# Patient Record
Sex: MALE | Race: WHITE | Employment: FULL TIME | URBAN - METROPOLITAN AREA
[De-identification: names, ages, dates, MRNs, and addresses within clinical notes are randomized per-mention and may not be internally consistent; named-entity substitution may affect disease eponyms.]

---

## 2017-05-25 ENCOUNTER — HOSPITAL ENCOUNTER (OUTPATIENT)
Dept: LAB | Age: 27
Discharge: HOME | End: 2017-05-25
Payer: COMMERCIAL

## 2017-05-25 ENCOUNTER — OFFICE VISIT (OUTPATIENT)
Dept: FAMILY MEDICINE CLINIC | Age: 27
End: 2017-05-25

## 2017-05-25 VITALS
HEART RATE: 115 BPM | WEIGHT: 288.4 LBS | BODY MASS INDEX: 42.72 KG/M2 | HEIGHT: 69 IN | OXYGEN SATURATION: 97 % | TEMPERATURE: 98.6 F | DIASTOLIC BLOOD PRESSURE: 90 MMHG | SYSTOLIC BLOOD PRESSURE: 120 MMHG | RESPIRATION RATE: 18 BRPM

## 2017-05-25 DIAGNOSIS — R10.11 RUQ PAIN: ICD-10-CM

## 2017-05-25 DIAGNOSIS — E03.9 ACQUIRED HYPOTHYROIDISM: ICD-10-CM

## 2017-05-25 DIAGNOSIS — K21.9 GASTROESOPHAGEAL REFLUX DISEASE, ESOPHAGITIS PRESENCE NOT SPECIFIED: ICD-10-CM

## 2017-05-25 DIAGNOSIS — E66.01 MORBID OBESITY DUE TO EXCESS CALORIES (HCC): ICD-10-CM

## 2017-05-25 DIAGNOSIS — R19.7 DIARRHEA, UNSPECIFIED TYPE: ICD-10-CM

## 2017-05-25 DIAGNOSIS — E03.9 ACQUIRED HYPOTHYROIDISM: Primary | ICD-10-CM

## 2017-05-25 LAB
ALBUMIN SERPL BCP-MCNC: 4.4 G/DL (ref 3.4–5)
ALBUMIN/GLOB SERPL: 1.3 {RATIO} (ref 0.8–1.7)
ALP SERPL-CCNC: 79 U/L (ref 45–117)
ALT SERPL-CCNC: 106 U/L (ref 16–61)
AMYLASE SERPL-CCNC: 31 U/L (ref 25–115)
ANION GAP BLD CALC-SCNC: 10 MMOL/L (ref 3–18)
AST SERPL W P-5'-P-CCNC: 30 U/L (ref 15–37)
BASOPHILS # BLD AUTO: 0 K/UL (ref 0–0.06)
BASOPHILS # BLD: 0 % (ref 0–2)
BILIRUB SERPL-MCNC: 0.3 MG/DL (ref 0.2–1)
BUN SERPL-MCNC: 17 MG/DL (ref 7–18)
BUN/CREAT SERPL: 18 (ref 12–20)
CALCIUM SERPL-MCNC: 9.4 MG/DL (ref 8.5–10.1)
CHLORIDE SERPL-SCNC: 103 MMOL/L (ref 100–108)
CO2 SERPL-SCNC: 25 MMOL/L (ref 21–32)
CREAT SERPL-MCNC: 0.92 MG/DL (ref 0.6–1.3)
DIFFERENTIAL METHOD BLD: NORMAL
EOSINOPHIL # BLD: 0.4 K/UL (ref 0–0.4)
EOSINOPHIL NFR BLD: 5 % (ref 0–5)
ERYTHROCYTE [DISTWIDTH] IN BLOOD BY AUTOMATED COUNT: 13.1 % (ref 11.6–14.5)
GLOBULIN SER CALC-MCNC: 3.4 G/DL (ref 2–4)
GLUCOSE SERPL-MCNC: 102 MG/DL (ref 74–99)
HCT VFR BLD AUTO: 46.7 % (ref 36–48)
HGB BLD-MCNC: 15.6 G/DL (ref 13–16)
LIPASE SERPL-CCNC: 104 U/L (ref 73–393)
LYMPHOCYTES # BLD AUTO: 27 % (ref 21–52)
LYMPHOCYTES # BLD: 2.3 K/UL (ref 0.9–3.6)
MCH RBC QN AUTO: 30.6 PG (ref 24–34)
MCHC RBC AUTO-ENTMCNC: 33.4 G/DL (ref 31–37)
MCV RBC AUTO: 91.6 FL (ref 74–97)
MONOCYTES # BLD: 0.9 K/UL (ref 0.05–1.2)
MONOCYTES NFR BLD AUTO: 10 % (ref 3–10)
NEUTS SEG # BLD: 4.9 K/UL (ref 1.8–8)
NEUTS SEG NFR BLD AUTO: 58 % (ref 40–73)
PLATELET # BLD AUTO: 227 K/UL (ref 135–420)
PMV BLD AUTO: 11.4 FL (ref 9.2–11.8)
POTASSIUM SERPL-SCNC: 4.1 MMOL/L (ref 3.5–5.5)
PROT SERPL-MCNC: 7.8 G/DL (ref 6.4–8.2)
RBC # BLD AUTO: 5.1 M/UL (ref 4.7–5.5)
SODIUM SERPL-SCNC: 138 MMOL/L (ref 136–145)
T4 FREE SERPL-MCNC: 1.2 NG/DL (ref 0.7–1.5)
TSH SERPL DL<=0.05 MIU/L-ACNC: 2.03 UIU/ML (ref 0.36–3.74)
WBC # BLD AUTO: 8.5 K/UL (ref 4.6–13.2)

## 2017-05-25 PROCEDURE — 84480 ASSAY TRIIODOTHYRONINE (T3): CPT | Performed by: PHYSICIAN ASSISTANT

## 2017-05-25 PROCEDURE — 83690 ASSAY OF LIPASE: CPT | Performed by: PHYSICIAN ASSISTANT

## 2017-05-25 PROCEDURE — 84439 ASSAY OF FREE THYROXINE: CPT | Performed by: PHYSICIAN ASSISTANT

## 2017-05-25 PROCEDURE — 85025 COMPLETE CBC W/AUTO DIFF WBC: CPT | Performed by: PHYSICIAN ASSISTANT

## 2017-05-25 PROCEDURE — 80053 COMPREHEN METABOLIC PANEL: CPT | Performed by: PHYSICIAN ASSISTANT

## 2017-05-25 PROCEDURE — 82150 ASSAY OF AMYLASE: CPT | Performed by: PHYSICIAN ASSISTANT

## 2017-05-25 RX ORDER — OMEPRAZOLE 20 MG/1
20 TABLET, DELAYED RELEASE ORAL 2 TIMES DAILY
Qty: 30 TAB | Refills: 1 | Status: SHIPPED | OUTPATIENT
Start: 2017-05-25 | End: 2017-06-22

## 2017-05-25 NOTE — PROGRESS NOTES
Naheed Avila is a 27 y.o. male here today with c/o reflux and loss of appetite. He has diarrhea after eating. Mom had the same thing and had to have her gall bladder removed. He has pain on the right side under the ribs where his gallbladder is located. Also has sinus drainage with a dry cough and has increased fatigue. Learning assessment completed.

## 2017-05-25 NOTE — LETTER
NOTIFICATION RETURN TO WORK / SCHOOL 
 
5/25/2017 2:01 PM 
 
Mr. Danielle Joy [address] [place] [id number] To Whom It May Concern: 
 
Danielle Joy is currently under the care of Ermias Curiel. He will return to work/school on 5/24/2017. Please excuse him on May 20, 22, and 23. If there are questions or concerns please have the patient contact our office.  
 
 
 
Sincerely, 
 
 
Sabrina Mccall PA-C

## 2017-05-25 NOTE — PATIENT INSTRUCTIONS

## 2017-05-25 NOTE — MR AVS SNAPSHOT
Visit Information Date & Time Provider Department Dept. Phone Encounter #  
 5/25/2017  1:00 PM Yoselyn Sandhu PA-C [employer] [phone] [id number] Follow-up Instructions Return in about 1 month (around 6/25/2017) for GERD, cough. Your Appointments 6/22/2017 12:45 PM  
Office Visit with ZAY Blandon ([facility]) Appt Note: Return in about 1 month (around 6/25/2017) for GERD, cough. [employer] [address] [address]  
[phone]  
  
   
 [employer] [facility] [address] [address] Upcoming Health Maintenance Date Due DTaP/Tdap/Td series (1 - Tdap) 11/17/2013 INFLUENZA AGE 9 TO ADULT 8/1/2017 Allergies as of 5/25/2017  Review Complete On: 5/25/2017 By: Joel Mcqueen LPN No Known Allergies Current Immunizations  Never Reviewed No immunizations on file. Not reviewed this visit You Were Diagnosed With   
  
 Codes Comments Acquired hypothyroidism    -  Primary ICD-10-CM: E03.9 ICD-9-CM: 244.9 Morbid [facility] due to excess [facility]     ICD-10-CM: E66.01 
ICD-9-CM: 278.01   
 RUQ pain     ICD-10-CM: R10.11 ICD-9-CM: 789.01 Gastroesophageal reflux disease, esophagitis presence not specified     ICD-10-CM: K21.9 ICD-9-CM: 530.81 Vitals BP Pulse Temp Resp Height(growth percentile) Weight(growth percentile) 120/90 (BP 1 Location: Left arm, BP Patient Position: Sitting) (!) 115 98.6 °F (37 °C) (Oral) 18 5' 9\" (1.753 m) 288 lb 6.4 oz (130.8 kg) SpO2 BMI Smoking Status 97% 42.59 kg/m2 Never Smoker Vitals History BMI and BSA Data Body Mass Index Body Surface Area 42.59 kg/m 2 2.52 m 2 Preferred Pharmacy Pharmacy Name Phone [facility]  [phone] Your Updated Medication List  
  
 This list is accurate as of: 5/25/17  1:56 PM.  Always use your most recent med list.  
  
  
  
  
 Mayi Livers 24HR PO Take  by mouth. omeprazole 20 mg tablet Commonly known as:  PRILOSEC OTC Take 20 mg by mouth two (2) times a day. Prescriptions Sent to Pharmacy Refills  
 omeprazole (PRILOSEC OTC) 20 mg tablet 1 Sig: Take 20 mg by mouth two (2) times a day. Class: Normal  
 Pharmacy: [address] #: [phone] Route: Oral  
  
Follow-up Instructions Return in about 1 month (around 6/25/2017) for GERD, cough. To-Do List   
 05/25/2017 Lab:  AMYLASE   
  
 05/25/2017 Lab:  CBC WITH AUTOMATED DIFF   
  
 05/25/2017 Lab:  LIPASE   
  
 05/25/2017 Lab:  METABOLIC PANEL, COMPREHENSIVE   
  
 05/25/2017 Lab:  T3 TOTAL   
  
 05/25/2017 Lab:  TSH AND FREE T4 Patient Instructions Gastroesophageal Reflux Disease (GERD): Care Instructions Your Care Instructions Gastroesophageal reflux disease (GERD) is the backward flow of stomach acid into the esophagus. The esophagus is the tube that leads from your throat to your stomach. A one-way valve prevents the stomach acid from moving up into this tube. When you have GERD, this valve does not close tightly enough. If you have mild GERD symptoms including heartburn, you may be able to control the problem with antacids or over-the-counter medicine. Changing your diet, losing weight, and making other lifestyle changes can also help reduce symptoms. Follow-up care is a key part of your treatment and safety. Be sure to make and go to all appointments, and call your doctor if you are having problems. Its also a good idea to know your test results and keep a list of the medicines you take. How can you care for yourself at home? · Take your medicines exactly as prescribed.  Call your doctor if you think you are having a problem with your medicine. · Your doctor may recommend over-the-counter medicine. For mild or occasional indigestion, antacids, such as Tums, Gaviscon, Mylanta, or Maalox, may help. Your doctor also may recommend over-the-counter acid reducers, such as Pepcid AC, Tagamet HB, Zantac 75, or Prilosec. Read and follow all instructions on the label. If you use these medicines often, talk with your doctor. · Change your eating habits. ¨ Its best to eat several small meals instead of two or three large meals. ¨ After you eat, wait 2 to 3 hours before you lie down. ¨ Chocolate, mint, and alcohol can make GERD worse. ¨ Spicy foods, foods that have a lot of acid (like tomatoes and oranges), and coffee can make GERD symptoms worse in some people. If your symptoms are worse after you eat a certain food, you may want to stop eating that food to see if your symptoms get better. · Do not smoke or chew tobacco. Smoking can make GERD worse. If you need help quitting, talk to your doctor about stop-smoking programs and medicines. These can increase your chances of quitting for good. · If you have GERD symptoms at night, raise the head of your bed 6 to 8 inches by putting the frame on blocks or placing a foam wedge under the head of your mattress. (Adding extra pillows does not work.) · Do not wear tight clothing around your middle. · Lose weight if you need to. Losing just 5 to 10 pounds can help. When should you call for help? Call your doctor now or seek immediate medical care if: 
· You have new or different belly pain. · Your stools are black and tarlike or have streaks of blood. Watch closely for changes in your health, and be sure to contact your doctor if: 
· Your symptoms have not improved after 2 days. · Food seems to catch in your throat or chest. 
Where can you learn more? Go to http://phu-kendall.info/. Enter T276 in the search box to learn more about \"Gastroesophageal Reflux Disease (GERD): Care Instructions. \" Current as of: August 9, 2016 Content Version: 11.2 © 0396-3608 [employer]. Care instructions adapted under license by [employer] (which disclaims liability or warranty for this information). If you have questions about a medical condition or this instruction, always ask your healthcare professional. [place] [address] any warranty or liability for your use of this information. Introducing [facility] HE[facility]! Tanis Epley introduces My Luv My Life My Heartbeats patient portal. Now you can access parts of your medical record, email your doctor's office, and request medication refills online. 1. In your internet browser, go to [email or web address] 2. Click on the First Time User? Click Here link in the Sign In box. You will see the New Member Sign Up page. 3. Enter your My Luv My Life My Heartbeats Access Code exactly as it appears below. You will not need to use this code after youve completed the sign-up process. If you do not sign up before the expiration date, you must request a new code. · My Luv My Life My Heartbeats Access Code: [email or web address] Expires: 8/23/2017  1:56 PM 
 
4. Enter the last four digits of your Social Security Number (xxxx) and Date of Birth (mm/dd/yyyy) as indicated and click Submit. You will be taken to the next sign-up page. 5. Create a My Luv My Life My Heartbeats ID. This will be your My Luv My Life My Heartbeats login ID and cannot be changed, so think of one that is secure and easy to remember. 6. Create a My Luv My Life My Heartbeats password. You can change your password at any time. 7. Enter your Password Reset Question and Answer. This can be used at a later time if you forget your password. 8. Enter your e-mail address. You will receive e-mail notification when new information is available in [address] 9. Click Sign Up. You can now view and download portions of your medical record. 10. Click the Download Summary menu link to download a portable copy of your medical information. If you have questions, please visit the Frequently Asked Questions section of the Apptopia website. Remember, Apptopia is NOT to be used for urgent needs. For medical emergencies, dial 911. Now available from your iPhone and Android! Please provide this summary of care documentation to your next provider. Your primary care clinician is listed as Nichole Nichols. If you have any questions after today's visit, please call [phone].

## 2017-05-25 NOTE — PROGRESS NOTES
HISTORY OF PRESENT ILLNESS  Charlotte Hanks is a 27 y.o. male. HPI  Charlotte Hanks is a 27 y.o. male who presents to the office today for RUQ pain and cough. He is new to the practice. He has a past hx of hypothyroidism but only took medication for one year and stopped because made him hungry all the time. When he was a child he was diagnosed with bronchitis frequently, and attributed this to tobacco smoke exposure. He never had PFTs done. He always has a dry cough. Nothing seems to make it better or worse. He does not have SOB or wheezing. He does not smoke cigarettes. He has been having RUQ pain and worsening burning in his chest for about 5 years. Initially this would be intermittent, but for the past 3 months it is almost daily. After eating food or drinking fluids, he will feel like it gets stuck in his esophagus. Then he experiences RUQ sharp pains. Occasionally he has vomited bile, and about once a month he has yellowish brown diarrhea. These symptoms are not always associated with eating, however. He does admit to drinking a lot of caffeine, several red bulls and Monster energy drinks, and soda. He takes Nexium only when his symptoms are really bothersome. Mom had issues with her gallbladder. He rarely drinks alcohol. His typical weight is around 245lbs, and in the last few years he is up to 288lbs. Chief Complaint   Patient presents with    GERD       No current outpatient prescriptions on file prior to visit. No current facility-administered medications on file prior to visit.       No Known Allergies  Past Medical History:   Diagnosis Date    ADHD (attention deficit hyperactivity disorder) 2001    Thyroid disease      History   Smoking Status    Never Smoker   Smokeless Tobacco    Never Used     History   Alcohol Use    Yes     Comment: occassionally     Family History   Problem Relation Age of Onset    Hypertension Mother     Diabetes Maternal Grandmother     Cancer Maternal Grandmother      Review of Systems   Constitutional: Negative for malaise/fatigue and weight loss. HENT: Negative for congestion, ear pain and sore throat. Respiratory: Positive for cough. Negative for sputum production, shortness of breath and wheezing. Cardiovascular: Positive for chest pain. Negative for palpitations. Chest burning with food   Gastrointestinal: Positive for abdominal pain, diarrhea, nausea and vomiting. Negative for blood in stool and constipation. Genitourinary: Negative for dysuria. Musculoskeletal: Negative for back pain. Skin: Negative for rash. Neurological: Negative for headaches. Endo/Heme/Allergies: Does not bruise/bleed easily. Psychiatric/Behavioral: Negative for depression. The patient is not nervous/anxious. Visit Vitals    /90 (BP 1 Location: Left arm, BP Patient Position: Sitting)  Comment: manual - thigh cuff    Pulse (!) 115    Temp 98.6 °F (37 °C) (Oral)    Resp 18    Ht 5' 9\" (1.753 m)    Wt 288 lb 6.4 oz (130.8 kg)    SpO2 97%    BMI 42.59 kg/m2     Physical Exam   Constitutional: He is oriented to person, place, and time. He appears well-developed and well-nourished. No distress. obese   HENT:   Right Ear: Tympanic membrane and ear canal normal.   Left Ear: Tympanic membrane and ear canal normal.   Nose: Nose normal.   Mouth/Throat: Uvula is midline, oropharynx is clear and moist and mucous membranes are normal.   Neck: Neck supple. No thyromegaly present. Cardiovascular: Regular rhythm. Tachycardia present. No murmur heard. Pulmonary/Chest: Effort normal and breath sounds normal. No respiratory distress. He has no wheezes. He has no rales. Abdominal: Normal appearance and bowel sounds are normal. He exhibits no mass. There is no hepatosplenomegaly. There is tenderness in the right upper quadrant and epigastric area. There is no guarding and no CVA tenderness. Musculoskeletal: He exhibits no edema. Lymphadenopathy:     He has no cervical adenopathy. Neurological: He is alert and oriented to person, place, and time. Psychiatric: He has a normal mood and affect. His behavior is normal. Thought content normal.   Nursing note and vitals reviewed. ASSESSMENT and PLAN    ICD-10-CM ICD-9-CM    1. Acquired hypothyroidism E03.9 244.9 CBC WITH AUTOMATED DIFF      METABOLIC PANEL, COMPREHENSIVE      AMYLASE      LIPASE      TSH AND FREE T4      T3 TOTAL   2. Morbid obesity due to excess calories (HCC) E66.01 278.01 CBC WITH AUTOMATED DIFF      METABOLIC PANEL, COMPREHENSIVE      AMYLASE      LIPASE      TSH AND FREE T4      T3 TOTAL   3. RUQ pain R10.11 789.01 CBC WITH AUTOMATED DIFF      METABOLIC PANEL, COMPREHENSIVE      AMYLASE      LIPASE      TSH AND FREE T4      T3 TOTAL      XR UGI/BA SWALLOW W SM BOWEL      US ABD COMP   4. Gastroesophageal reflux disease, esophagitis presence not specified K21.9 530.81 CBC WITH AUTOMATED DIFF      METABOLIC PANEL, COMPREHENSIVE      AMYLASE      LIPASE      TSH AND FREE T4      T3 TOTAL      omeprazole (PRILOSEC OTC) 20 mg tablet      XR UGI/BA SWALLOW W SM BOWEL      US ABD COMP   5. Diarrhea, unspecified type R19.7 787.91 US ABD COMP      Ordering thyroid labs and will treat if needed. I am ordering a barium swallow study, abdominal US, and labs for his GI issues. He will start Omeprazole daily for 2 weeks. We discussed the need for weight loss, and to cut down significantly on his caffeine intake. No more energy drinks! Will follow up once he has completed all testing. Reviewed medication and side effects. Patient agrees with the plan and verbalizes understanding. Follow-up Disposition:  Return in about 1 month (around 6/25/2017) for GERD, cough.     Joey Renee PA-C  5/25/2017

## 2017-05-26 LAB — T3 SERPL-MCNC: 151 NG/DL (ref 71–180)

## 2017-06-22 ENCOUNTER — OFFICE VISIT (OUTPATIENT)
Dept: FAMILY MEDICINE CLINIC | Age: 27
End: 2017-06-22

## 2017-06-22 VITALS
DIASTOLIC BLOOD PRESSURE: 89 MMHG | HEIGHT: 69 IN | WEIGHT: 283 LBS | RESPIRATION RATE: 18 BRPM | OXYGEN SATURATION: 97 % | HEART RATE: 88 BPM | SYSTOLIC BLOOD PRESSURE: 139 MMHG | BODY MASS INDEX: 41.92 KG/M2 | TEMPERATURE: 97.5 F

## 2017-06-22 DIAGNOSIS — K21.9 GASTROESOPHAGEAL REFLUX DISEASE WITHOUT ESOPHAGITIS: ICD-10-CM

## 2017-06-22 DIAGNOSIS — K76.0 FATTY LIVER: ICD-10-CM

## 2017-06-22 DIAGNOSIS — R53.82 CHRONIC FATIGUE: ICD-10-CM

## 2017-06-22 DIAGNOSIS — E66.01 MORBID OBESITY DUE TO EXCESS CALORIES (HCC): Primary | ICD-10-CM

## 2017-06-22 DIAGNOSIS — E03.9 ACQUIRED HYPOTHYROIDISM: ICD-10-CM

## 2017-06-22 RX ORDER — LANSOPRAZOLE 15 MG/1
15 TABLET, ORALLY DISINTEGRATING, DELAYED RELEASE ORAL
Qty: 30 TAB | Refills: 1 | Status: SHIPPED | OUTPATIENT
Start: 2017-06-22

## 2017-06-22 NOTE — PATIENT INSTRUCTIONS
Sleep Studies: About This Test  What is it? Sleep studies are tests that watch what happens to your body during sleep. These studies usually are done in a sleep lab. Sleep labs are often located in hospitals. But sleep studies also can be done with portable equipment that you use at home. Why is this test done? Sleep studies are done to find sleep problems, including:  · Sleep apnea or excessive snoring. · Narcolepsy. · Nocturnal seizures. · REM behavior disorder (RBD). · Repeated muscle twitching of the feet, arms, or legs while you sleep. How can you prepare for the test?  · You may be asked to keep a sleep diary for 1 to 2 weeks before your sleep study. · Don't take any naps for 2 to 3 days before your test.  · You may be asked to avoid food or drinks with caffeine for a day or two before your test.  · Take a shower or bath before your test, but don't use sprays, oils, or gels on your hair. Don't wear makeup, fingernail polish, or fake nails. · Pack and take along a small overnight bag with personal items, such as a toothbrush, a comb, favorite pillows or blankets, and a book. You can wear your own nightclothes. What happens during the test?  · In the sleep lab, you will be in a private room, much like a hotel room. · Small pads or patches called electrodes will be placed on your head and body with a small amount of glue and tape. These will record things like brain activity, eye movement, oxygen levels, and snoring. · Soft elastic belts will be placed around your chest and belly to measure your breathing. · Your blood oxygen levels will be checked by a small clip (oximeter) placed either on the tip of your index finger or on your earlobe. · If you have sleep apnea, you may wear a mask that is connected to a continuous positive airway pressure (CPAP) machine.   · Depending on the type of test, you will be allowed to sleep through the night or you will be awakened periodically and asked to stay awake for a while. What else should you know about the test?  · It may feel odd to be hooked to the sleep study equipment. The sleep lab technician understands that your sleep may not be the same as it is at home because of the equipment. Try to relax and make yourself as comfortable as possible. · After your sleep problem has been identified, you may need a second study if your doctor orders treatment such as CPAP. · Portable sleep study equipment is available for a person to do sleep studies at home. This may be a choice for people who have problems sleeping in a sleep lab. But home sleep studies may not give the same results as a sleep lab. How long does the test take? · You will stay in the sleep lab overnight. For some tests, you will also stay part of the next day. What happens after the test?  · You will be able to go home right away. · You can go back to your usual activities right away. Follow-up care is a key part of your treatment and safety. Be sure to make and go to all appointments, and call your doctor if you are having problems. It's also a good idea to keep a list of the medicines you take. Ask your doctor when you can expect to have your test results. Where can you learn more? Go to http://phu-kendall.info/. Enter N793 in the search box to learn more about \"Sleep Studies: About This Test.\"  Current as of: March 25, 2017  Content Version: 11.3  © 3814-7450 [employer]. Care instructions adapted under license by [employer] (which disclaims liability or warranty for this information). If you have questions about a medical condition or this instruction, always ask your healthcare professional. [place] [address] any warranty or liability for your use of this information.

## 2017-06-22 NOTE — MR AVS SNAPSHOT
Visit Information Date & Time Provider Department Dept. Phone Encounter #  
 6/22/2017 12:45 PM Basilia Ashton PA-C [facility] [phone] [id number] Follow-up Instructions Return in about 1 month (around 7/22/2017) for LFT abnormal, fatigue, sleep study. Upcoming Health Maintenance Date Due DTaP/Tdap/Td series (1 - Tdap) 11/17/2013 INFLUENZA AGE 9 TO ADULT 8/1/2017 Allergies as of 6/22/2017  Review Complete On: 6/22/2017 By: Cristina Singleton, ALIX, RT, NM, ARRT No Known Allergies Current Immunizations  Never Reviewed No immunizations on file. Not reviewed this visit You Were Diagnosed With   
  
 Codes Comments Morbid obesity due to excess calories [facility]    -  Primary ICD-10-CM: E66.01 
ICD-9-CM: 278.01 Chronic fatigue     ICD-10-CM: R53.82 
ICD-9-CM: 780.79 Gastroesophageal reflux disease without esophagitis     ICD-10-CM: K21.9 ICD-9-CM: 530.81 Vitals BP Pulse Temp Resp Height(growth percentile) Weight(growth percentile) 139/89 (BP 1 Location: Left arm, BP Patient Position: Sitting) 88 97.5 °F (36.4 °C) (Oral) 18 5' 9\" (1.753 m) 283 lb (128.4 kg) SpO2 BMI Smoking Status 97% 41.79 kg/m2 Never Smoker Vitals History BMI and BSA Data Body Mass Index Body Surface Area 41.79 kg/m 2 2.5 m 2 Preferred Pharmacy Pharmacy Name Phone [place] [phone] [facility] [phone] Your Updated Medication List  
  
   
This list is accurate as of: 6/22/17  1:19 PM.  Always use your most recent med list.  
  
  
  
  
 Zia Kaminski 24HR PO Take  by mouth. omeprazole 20 mg tablet Commonly known as:  PRILOSEC OTC Take 20 mg by mouth two (2) times a day. Follow-up Instructions Return in about 1 month (around 7/22/2017) for LFT abnormal, fatigue, sleep study. Patient Instructions Sleep Studies: About This Test 
What is it? Sleep studies are tests that watch what happens to your body during sleep. These studies usually are done in a sleep lab. Sleep labs are often located in hospitals. But sleep studies also can be done with portable equipment that you use at home. Why is this test done? Sleep studies are done to find sleep problems, including: · Sleep apnea or excessive snoring. · Narcolepsy. · Nocturnal seizures. · REM behavior disorder (RBD). · Repeated muscle twitching of the feet, arms, or legs while you sleep. How can you prepare for the test? 
· You may be asked to keep a sleep diary for 1 to 2 weeks before your sleep study. · Don't take any naps for 2 to 3 days before your test. 
· You may be asked to avoid food or drinks with caffeine for a day or two before your test. 
· Take a shower or bath before your test, but don't use sprays, oils, or gels on your hair. Don't wear makeup, fingernail polish, or fake nails. · Pack and take along a small overnight bag with personal items, such as a toothbrush, a comb, favorite pillows or blankets, and a book. You can wear your own nightclothes. What happens during the test? 
· In the sleep lab, you will be in a private room, much like a hotel room. · Small pads or patches called electrodes will be placed on your head and body with a small amount of glue and tape. These will record things like brain activity, eye movement, oxygen levels, and snoring. · Soft elastic belts will be placed around your chest and belly to measure your breathing. · Your blood oxygen levels will be checked by a small clip (oximeter) placed either on the tip of your index finger or on your earlobe. · If you have sleep apnea, you may wear a mask that is connected to a continuous positive airway pressure (CPAP) machine.  
· Depending on the type of test, you will be allowed to sleep through the night or you will be awakened periodically and asked to stay awake for a while. What else should you know about the test? 
· It may feel odd to be hooked to the sleep study equipment. The sleep lab technician understands that your sleep may not be the same as it is at home because of the equipment. Try to relax and make yourself as comfortable as possible. · After your sleep problem has been identified, you may need a second study if your doctor orders treatment such as CPAP. · Portable sleep study equipment is available for a person to do sleep studies at home. This may be a choice for people who have problems sleeping in a sleep lab. But home sleep studies may not give the same results as a sleep lab. How long does the test take? · You will stay in the sleep lab overnight. For some tests, you will also stay part of the next day. What happens after the test? 
· You will be able to go home right away. · You can go back to your usual activities right away. Follow-up care is a key part of your treatment and safety. Be sure to make and go to all appointments, and call your doctor if you are having problems. It's also a good idea to keep a list of the medicines you take. Ask your doctor when you can expect to have your test results. Where can you learn more? Go to http://phu-kendall.info/. Enter U857 in the search box to learn more about \"Sleep Studies: About This Test.\" Current as of: March 25, 2017 Content Version: 11.3 © 4910-7077 [employer]. Care instructions adapted under license by "[employer] (which disclaims liability or warranty for this information). If you have questions about a medical condition or this instruction, always ask your healthcare professional. [place] [address] any warranty or liability for your use of this information. Introducing [facility] & [facility] SERVICES! Veronica Morales introduces [employer] patient portal. Now you can access parts of your medical record, email your doctor's office, and request medication refills online. 1. In your internet browser, go to [email or web address] 2. Click on the First Time User? Click Here link in the Sign In box. You will see the New Member Sign Up page. 3. Enter your [employer] Access Code exactly as it appears below. You will not need to use this code after youve completed the sign-up process. If you do not sign up before the expiration date, you must request a new code. · [employer] Access Code: [email or web address] Expires: 8/23/2017  1:56 PM 
 
4. Enter the last four digits of your Social Security Number (xxxx) and Date of Birth (mm/dd/yyyy) as indicated and click Submit. You will be taken to the next sign-up page. 5. Create a [employer]Focus ID. This will be your [employer] login ID and cannot be changed, so think of one that is secure and easy to remember. 6. Create a [employer] password. You can change your password at any time. 7. Enter your Password Reset Question and Answer. This can be used at a later time if you forget your password. 8. Enter your e-mail address. You will receive e-mail notification when new information is available in [address] 9. Click Sign Up. You can now view and download portions of your medical record. 10. Click the Download Summary menu link to download a portable copy of your medical information. If you have questions, please visit the Frequently Asked Questions section of the [employer] website. Remember, [employer] is NOT to be used for urgent needs. For medical emergencies, dial 911. Now available from your iPhone and Android! Please provide this summary of care documentation to your next provider. Your primary care clinician is listed as Roseanne Trejo. If you have any questions after today's visit, please call [phone].

## 2017-06-22 NOTE — PROGRESS NOTES
HISTORY OF PRESENT ILLNESS  Otis Mcdonald is a 27 y.o. male. HPI  Otis Mcdonald is a 27 y.o. male who presents to the office today for Lab and imaging results. He was seen at his initial visit last month with c/o chronic cough, RUQ pain, burning in chest and feeling like food \"gets stuck\" in his esophagus when he eats. He was started on Omeprazole 20mg BID. He says for more than half of the time he took the medication it worked well. He no longer had the sensation of food getting stuck when swallowed, abdominal pain and chest pain improved a lot, and nausea resolved. One day he forgot to take his second dose and the following day his symptoms started to worsen again. A barium swallow study was normal. He has cut back significantly on Energy Drinks. He is down to 3-4 a week and he has increased his water intake. He did lose about 5 lbs this month. Reviewed his lab results. Thyroid function is normal. ALT is elevated at 106, remainder of labs look okay. Abdominal US showed a fatty liver and report is noted below. He still has c/o chronic fatigue and daytime tiredness. He feels like he gets a good night's sleep, but wakes up \"groggy\". Yesterday he came home from work and took a nap for 3 hours. He does not think he snores. He does have a hx of depression that was diagnosed when he was 11 yo. A psychiatrist put him on zoloft and he said he became angry and this medication put him in an \"emotional chemical bubble. \" He also has a hx of ADHD but has learned to deal with this on his own. Chief Complaint   Patient presents with    GERD       Current Outpatient Prescriptions on File Prior to Visit   Medication Sig Dispense Refill    ESOMEPRAZOLE MAGNESIUM (NEXIUM 24HR PO) Take  by mouth. No current facility-administered medications on file prior to visit.       No Known Allergies  Past Medical History:   Diagnosis Date    ADHD (attention deficit hyperactivity disorder) 2001    Thyroid disease History   Smoking Status    Never Smoker   Smokeless Tobacco    Never Used     History   Alcohol Use    Yes     Comment: occassionally     Family History   Problem Relation Age of Onset    Hypertension Mother     Diabetes Maternal Grandmother     Cancer Maternal Grandmother      Review of Systems   Constitutional: Positive for malaise/fatigue. Negative for diaphoresis and weight loss. Respiratory: Positive for cough. Negative for sputum production, shortness of breath and wheezing. Intermittent, Dry, chronic   Cardiovascular: Negative for palpitations and leg swelling. Gastrointestinal: Positive for abdominal pain, nausea and vomiting. Negative for blood in stool, constipation and heartburn. Improved some with PPI   Musculoskeletal: Negative for back pain. Skin: Negative for rash. Neurological: Negative for dizziness and headaches. Endo/Heme/Allergies: Does not bruise/bleed easily. Psychiatric/Behavioral: Negative for depression and substance abuse. The patient is not nervous/anxious and does not have insomnia. Visit Vitals    /89 (BP 1 Location: Left arm, BP Patient Position: Sitting)    Pulse 88    Temp 97.5 °F (36.4 °C) (Oral)    Resp 18    Ht 5' 9\" (1.753 m)    Wt 283 lb (128.4 kg)    SpO2 97%    BMI 41.79 kg/m2     Physical Exam   Constitutional: He is oriented to person, place, and time. He appears well-developed and well-nourished. No distress. obese   HENT:   Right Ear: Tympanic membrane and ear canal normal.   Left Ear: Tympanic membrane and ear canal normal.   Mouth/Throat: Uvula is midline and mucous membranes are normal.   Cardiovascular: Normal rate and regular rhythm. No murmur heard. Pulses:       Radial pulses are 2+ on the right side, and 2+ on the left side. Pulmonary/Chest: Effort normal and breath sounds normal. No respiratory distress. He has no wheezes. He has no rales. Abdominal: Soft. Normal appearance. He exhibits no mass. There is no hepatosplenomegaly. There is no tenderness. Musculoskeletal: He exhibits no edema. Neurological: He is alert and oriented to person, place, and time. Psychiatric: He has a normal mood and affect. His behavior is normal. Thought content normal.   Nursing note and vitals reviewed. Lab Results   Component Value Date/Time    Sodium 138 05/25/2017 01:53 PM    Potassium 4.1 05/25/2017 01:53 PM    Chloride 103 05/25/2017 01:53 PM    CO2 25 05/25/2017 01:53 PM    Anion gap 10 05/25/2017 01:53 PM    Glucose 102 05/25/2017 01:53 PM    BUN 17 05/25/2017 01:53 PM    Creatinine 0.92 05/25/2017 01:53 PM    BUN/Creatinine ratio 18 05/25/2017 01:53 PM    GFR est AA >60 05/25/2017 01:53 PM    GFR est non-AA >60 05/25/2017 01:53 PM    Calcium 9.4 05/25/2017 01:53 PM    Bilirubin, total 0.3 05/25/2017 01:53 PM    AST (SGOT) 30 05/25/2017 01:53 PM    Alk. phosphatase 79 05/25/2017 01:53 PM    Protein, total 7.8 05/25/2017 01:53 PM    Albumin 4.4 05/25/2017 01:53 PM    Globulin 3.4 05/25/2017 01:53 PM    A-G Ratio 1.3 05/25/2017 01:53 PM    ALT (SGPT) 106 05/25/2017 01:53 PM     Abdominal US 5/31/17:  FINDINGS:  The liver is echogenic. It measures 18.5 cm. It shows focal fatty sparing  adjacent to the gallbladder.     There are no gallstones. Wang's sign is negative. The common bile duct  measures 2 mm.     The pancreas is normal.     The aorta and inferior vena cava show normal diameters.     The spleen is normal. It measures 12.6 x 12.5 cm.     The right kidney measures 11.6 x 4.8 x 4.7 cm and is normal. The left kidney  measures 12.6 x 5.4 x 4.2 cm and is normal.     IMPRESSION:  Fatty infiltration liver with focal fatty sparing. Otherwise unremarkable study    ASSESSMENT and PLAN    ICD-10-CM ICD-9-CM    1. Morbid obesity due to excess calories (HCC) E66.01 278.01    2. Chronic fatigue R53.82 780.79 REFERRAL TO SLEEP STUDIES         3.  Gastroesophageal reflux disease without esophagitis K21.9 530.81 lansoprazole (PREVACID SOLUTAB) 15 mg disintegrating tablet   4. Fatty liver K76.0 571.8    5. Acquired hypothyroidism E03.9 244.9       He has failed nexium and omeprazole. Will try prevacid daily. He needs to continue to work on weight loss and eliminate energy drinks and caffeine intake. I am referring him for a sleep study to evaluate his chronic fatigue. Will recheck his LFT at next visit. TSH, T3 and Free T4 all within normal range. Thyroid function was normal. Will recheck this at his follow up appt. Reviewed medication and side effects. Patient agrees with the plan and verbalizes understanding. Follow-up Disposition:  Return in about 1 month (around 7/22/2017) for LFT abnormal, fatigue, sleep study.     Jorge Luis Keenan PA-C  6/22/2017

## 2017-06-22 NOTE — PROGRESS NOTES
Emely Burden is a 27 y.o. male here for follow up. Still has trouble with fatigue. Still has problem with GERD. Has had some studies done since last visit which are in [facility].

## 2021-06-28 PROBLEM — K76.0 HEPATIC STEATOSIS: Status: ACTIVE | Noted: 2021-06-28

## 2021-06-28 PROBLEM — R10.13 DYSPEPSIA: Status: ACTIVE | Noted: 2021-06-28

## 2022-03-18 PROBLEM — K76.0 HEPATIC STEATOSIS: Status: ACTIVE | Noted: 2021-06-28

## 2022-03-18 PROBLEM — E66.01 MORBID OBESITY DUE TO EXCESS CALORIES (HCC): Status: ACTIVE | Noted: 2017-05-25

## 2022-03-19 PROBLEM — R10.11 RUQ ABDOMINAL PAIN: Status: ACTIVE | Noted: 2017-05-25

## 2022-03-19 PROBLEM — K21.9 GASTROESOPHAGEAL REFLUX DISEASE: Status: ACTIVE | Noted: 2017-05-25

## 2022-03-19 PROBLEM — E03.9 ACQUIRED HYPOTHYROIDISM: Status: ACTIVE | Noted: 2017-05-25

## 2022-03-20 PROBLEM — R19.7 DIARRHEA: Status: ACTIVE | Noted: 2017-05-25

## 2022-03-20 PROBLEM — R10.13 DYSPEPSIA: Status: ACTIVE | Noted: 2021-06-28

## 2023-01-31 RX ORDER — DICYCLOMINE HCL 20 MG
20 TABLET ORAL EVERY 6 HOURS
COMMUNITY
Start: 2021-06-28